# Patient Record
Sex: FEMALE | Race: WHITE | Employment: FULL TIME | URBAN - METROPOLITAN AREA
[De-identification: names, ages, dates, MRNs, and addresses within clinical notes are randomized per-mention and may not be internally consistent; named-entity substitution may affect disease eponyms.]

---

## 2017-01-19 ENCOUNTER — OFFICE VISIT (OUTPATIENT)
Dept: ORTHOPEDIC SURGERY | Age: 57
End: 2017-01-19

## 2017-01-19 VITALS — WEIGHT: 140 LBS | BODY MASS INDEX: 22.5 KG/M2 | HEIGHT: 66 IN

## 2017-01-19 DIAGNOSIS — M25.521 ELBOW PAIN, RIGHT: Primary | ICD-10-CM

## 2017-01-19 DIAGNOSIS — M77.01 MEDIAL EPICONDYLITIS, RIGHT ELBOW: ICD-10-CM

## 2017-01-19 PROCEDURE — MISCD86 TENNIS ELBOW STRAP-BREG: Performed by: ORTHOPAEDIC SURGERY

## 2017-01-19 PROCEDURE — 73080 X-RAY EXAM OF ELBOW: CPT | Performed by: ORTHOPAEDIC SURGERY

## 2017-01-19 PROCEDURE — 99203 OFFICE O/P NEW LOW 30 MIN: CPT | Performed by: ORTHOPAEDIC SURGERY

## 2017-01-20 ENCOUNTER — HOSPITAL ENCOUNTER (OUTPATIENT)
Dept: OCCUPATIONAL THERAPY | Age: 57
Discharge: HOME | End: 2017-01-31
Admitting: ORTHOPAEDIC SURGERY

## 2017-02-03 ENCOUNTER — HOSPITAL ENCOUNTER (OUTPATIENT)
Dept: OCCUPATIONAL THERAPY | Age: 57
Discharge: HOME | End: 2017-02-03
Admitting: ORTHOPAEDIC SURGERY

## 2017-05-01 ENCOUNTER — OFFICE VISIT (OUTPATIENT)
Dept: ORTHOPEDIC SURGERY | Age: 57
End: 2017-05-01

## 2017-05-01 VITALS — WEIGHT: 139.99 LBS | BODY MASS INDEX: 22.5 KG/M2 | HEIGHT: 66 IN

## 2017-05-01 DIAGNOSIS — M50.30 DDD (DEGENERATIVE DISC DISEASE), CERVICAL: Primary | ICD-10-CM

## 2017-05-01 DIAGNOSIS — M54.2 NECK PAIN: ICD-10-CM

## 2017-05-01 PROCEDURE — 99203 OFFICE O/P NEW LOW 30 MIN: CPT | Performed by: PHYSICAL MEDICINE & REHABILITATION

## 2017-05-01 PROCEDURE — 72040 X-RAY EXAM NECK SPINE 2-3 VW: CPT | Performed by: PHYSICAL MEDICINE & REHABILITATION

## 2017-05-01 RX ORDER — VENLAFAXINE 75 MG/1
TABLET ORAL
COMMUNITY
Start: 2017-04-11 | End: 2018-09-01

## 2017-05-01 RX ORDER — METHYLPREDNISOLONE 4 MG/1
TABLET ORAL
Qty: 1 KIT | Refills: 0 | Status: SHIPPED | OUTPATIENT
Start: 2017-05-01 | End: 2017-08-24 | Stop reason: ALTCHOICE

## 2017-05-01 RX ORDER — TAMOXIFEN CITRATE 20 MG/1
TABLET ORAL
COMMUNITY
Start: 2017-03-10

## 2017-05-01 RX ORDER — GABAPENTIN 300 MG/1
CAPSULE ORAL
COMMUNITY
Start: 2017-04-11 | End: 2018-09-01

## 2017-08-24 ENCOUNTER — OFFICE VISIT (OUTPATIENT)
Dept: ORTHOPEDIC SURGERY | Age: 57
End: 2017-08-24

## 2017-08-24 VITALS
DIASTOLIC BLOOD PRESSURE: 90 MMHG | HEART RATE: 100 BPM | BODY MASS INDEX: 22.5 KG/M2 | SYSTOLIC BLOOD PRESSURE: 127 MMHG | HEIGHT: 66 IN | WEIGHT: 140 LBS

## 2017-08-24 DIAGNOSIS — M24.829 ELBOW LOCKING: ICD-10-CM

## 2017-08-24 DIAGNOSIS — M77.01 MEDIAL EPICONDYLITIS, RIGHT ELBOW: Primary | ICD-10-CM

## 2017-08-24 PROCEDURE — 99213 OFFICE O/P EST LOW 20 MIN: CPT | Performed by: ORTHOPAEDIC SURGERY

## 2017-09-14 ENCOUNTER — OFFICE VISIT (OUTPATIENT)
Dept: ORTHOPEDIC SURGERY | Age: 57
End: 2017-09-14

## 2017-09-14 VITALS — BODY MASS INDEX: 22.32 KG/M2 | WEIGHT: 138.89 LBS | HEIGHT: 66 IN

## 2017-09-14 DIAGNOSIS — M77.01 MEDIAL EPICONDYLITIS, RIGHT ELBOW: ICD-10-CM

## 2017-09-14 DIAGNOSIS — M19.029 ELBOW ARTHRITIS: ICD-10-CM

## 2017-09-14 DIAGNOSIS — M24.829 ELBOW LOCKING: Primary | ICD-10-CM

## 2017-09-14 PROCEDURE — 99213 OFFICE O/P EST LOW 20 MIN: CPT | Performed by: ORTHOPAEDIC SURGERY

## 2018-06-21 ENCOUNTER — OFFICE VISIT (OUTPATIENT)
Dept: ORTHOPEDIC SURGERY | Age: 58
End: 2018-06-21

## 2018-06-21 VITALS — WEIGHT: 145 LBS | BODY MASS INDEX: 23.3 KG/M2 | HEIGHT: 66 IN

## 2018-06-21 DIAGNOSIS — M77.12 LEFT LATERAL EPICONDYLITIS: ICD-10-CM

## 2018-06-21 DIAGNOSIS — M77.01 MEDIAL EPICONDYLITIS, RIGHT ELBOW: ICD-10-CM

## 2018-06-21 DIAGNOSIS — M25.522 LEFT ELBOW PAIN: Primary | ICD-10-CM

## 2018-06-21 DIAGNOSIS — M25.521 RIGHT ELBOW PAIN: ICD-10-CM

## 2018-06-21 PROCEDURE — 99213 OFFICE O/P EST LOW 20 MIN: CPT | Performed by: ORTHOPAEDIC SURGERY

## 2018-07-06 ENCOUNTER — HOSPITAL ENCOUNTER (OUTPATIENT)
Dept: OCCUPATIONAL THERAPY | Age: 58
Discharge: HOME | End: 2018-07-03
Admitting: ORTHOPAEDIC SURGERY

## 2018-07-06 NOTE — PLAN OF CARE
barriers  []Other: Tolerance of evaluation/treatment:    [] Excellent [x] Good [] Fair  [] Poor    PLAN OF CARE:  Interventions:   [x] Therapeutic Exercise [x] Therapeutic Activity    [x] Activities of Daily Living [x] Neuromuscular Re-education      [x] Patient Education  [x] Manual Therapy      [x] Modalities as needed, and not otherwise contraindicated, including: ultrasound,paraffin,moist heat/cold pack, electrical stimulation, contrast bath, iontophoresis  [] Splinting    Frequency/Duration:  2 days per week for 6 weeks    GOALS:  Short Term Goals: To be achieved in: 2 weeks  1. Independent in HEP and progression per patient tolerance, in order to prevent re-injury. 2. Patient will have a decrease in pain to facilitate improvement in movement, function, and ADLs as indicated by Functional Deficits. Long Term Goals to be achieved in 6  weeks, including patient directed goals to address identified performance deficits:  1) Pt to be independent in graded HEP progression with a good level of effort and compliance. 2) Pt to report a score of 15 % or less on the Quick DASH disability questionnaire for increased performance with carrying, moving, and handling objects. 3) Pt will have a decrease in pain to 2/10 or less to facilitate improvement in performance with lifting, reaching, home mgt tasks, and work out.       OCCUPATIONAL THERAPY EVALUATION COMPLEXITY JUSTIFICATION:    [x] An occupational profile and medical/therapy history, which includes:   [x] a brief history including medical and/or therapy records relating to the     presenting problem   [] an expanded review of medical and/or therapy records and additional review     of physical, cognitive or psychosocial history related to current functional    performance   [] an extensive additional review of review of medical and/or therapy records   and physical, cognitive, or psychosocial history related to current    functional performance    [x] An

## 2018-07-11 ENCOUNTER — HOSPITAL ENCOUNTER (OUTPATIENT)
Dept: OCCUPATIONAL THERAPY | Age: 58
Discharge: HOME | End: 2018-07-12
Admitting: ORTHOPAEDIC SURGERY

## 2018-07-13 ENCOUNTER — HOSPITAL ENCOUNTER (OUTPATIENT)
Dept: OCCUPATIONAL THERAPY | Age: 58
Discharge: HOME | End: 2018-07-14
Admitting: ORTHOPAEDIC SURGERY

## 2018-07-13 NOTE — FLOWSHEET NOTE
[facility],  [address] [place]  Phone: [phone]  Fax [phone]  Hand Therapy Daily Treatment Note  Date:  2018    Patient: Jignesh Sarkar   : 1960   MRN: [id number]  Referring Physician: Referring Practitioner: Darrian       Medical Diagnosis Information:  M77.12 (ICD-10-CM) - Left lateral epicondylitis; M77.01 (ICD-10-CM) - Medial epicondylitis, right elbow;    Treatment Dx: U98.466 (ICD-10-CM) -Left elbow painM25.521 (ICD-10-CM) - Right elbow pain         Date of injury: 2 years ago progressive onset  Date and type of Surgery: N/A                                   Insurance information:  [place] [id number]/OT - NO AUTH    Comorbidities Affecting Functional Performance:     []Anxiety (F41.9)/Depression (F32.9)   []Diabetes Type 1(E10.65) or 2 (E11.65)   []Rheumatoid Arthritis (M05.9)  []Fibromyalgia (M79.7)  []Neuropathy(G60.9)  []Osteoarthritis(M19.91)  [x]None   []Other:    G-code: OT G-codes  Functional Assessment Tool Used: DASH  Score: 25%  Functional Limitation: Carrying, moving and handling objects  Carrying, Moving and Handling Objects Current Status (): At least 20 percent but less than 40 percent impaired, limited or restricted  Carrying, Moving and Handling Objects Goal Status ():  At least 1 percent but less than 20 percent impaired, limited or restricted    Date of Patient follow up with Physician:  Preferred Language for Healthcare:   [x]English       []other:  Latex Allergy:  [x]NO      []YES  RESTRICTIONS/PRECAUTIONS:   NONE   Progress Note: [x]  Yes  []  No  Next due by : Visit #10       Visit # Insurance Allowable Requires auth   3/60 60    no:[x]                  yes:[]      Pain level: 2/10     SUBJECTIVE: Pt reports no significant change in status yet however she is trying to follow all recommended precautions  she states why she is here after 2 years of pain is that it now aches all the time, and used to only hurt with use. Seems that left lateral elbow hurts the most, and she states that the right elbow cracks and gets stuck with quick elbow extension. History of breast cancer and finished chemo just before the canoeing incident. From that she always has tingling in her finger tips and her toes. She also has a history of back pain with steroid injection which helped her very much. That pain is starting to return. Pt reports chronic pain over the last 2 years, began with canoeing as hard as she could competing against her  2 years ago. OBJECTIVE:   Date:  Hand Dominance:     [x]  Right    [] Left 7/6/2018      Objective Measures:     PAIN 2-7/10   Quick DASH 25%   Digits tip to DPFC in cm WNL   Thumb ROM WNL   Wrist ROM Ext/Flex WNL   Forearm ROM  Sup/pron WNL   Elbow ROM Ext/flex WNL:   Shoulder Flex  Shoulder Abd  Shoulder IR/ER WNL   Edema in cm circumf. MCPJs R:  L:   Edema in cm circumf. Wrist R:  L:    strength in lbs R: 61#  L:60#   Pinch Strengthin lbs: lat  R:  L:   Pinch Strength in lbs:  3 point R:  L:      MMT: Shoulder ABD and Flex Shoulders L 5/5 and R  5-/5       Modalities: 7/6/18 7/11/18 7/13/18   TENS + HP each arm  Left lateral elbow, right medial and lateral elbow (used separate channels) INF + HP 15'         Therapeutic Exercise & Activities:      PROM   Forearm and wrist stretch   Pt educ HEP passive forearm/wrist stretching and ; pain mgt Passive wrist ext and flex using wall and tabletop    Pt educ recommendations & precautions related to ADLs     finisher   2# 8'   pilates ring   8'                                   Therapeutic Exercise and NMR EXR  [x] (89234) Provided verbal/tactile cueing for activities related to strengthening, flexibility, endurance, ROM  for improvements in scapular, scapulothoracic and UE control with self care, reaching, carrying, lifting, house/yardwork, driving/computer work.     [] (58463) Provided verbal/tactile cueing for activities related to improving balance, coordination, kinesthetic sense, posture, motor skill, proprioception  to assist with  scapular, scapulothoracic and UE control with self care, reaching, carrying, lifting, house/yardwork, driving/computer work. Therapeutic Activities:    [] ( 54350) therapeutic activities, direct (one on one) patient contact. Use of dynamic activities to improve functional performance.     Activities of Daily Living:  [] (47885) Provided self-care/home management training (i.e., activities of daily living and compensatory training, meal preparation, safety procedures, and instructions in use of assistive technology devices/adaptive equipment)     Home Exercise Program:    [] (66644) Reviewed/Progressed HEP activities related to strengthening, flexibility, endurance, ROM of scapular, scapulothoracic and UE control with self care, reaching, carrying, lifting, house/yardwork, driving/computer work    Manual Treatments: deep tissue mobs bilateral forearms, extensors and flexors near elbows, using biofreeze  [x] ([id number]) Provided manual therapy to mobilize soft tissue/joints of the UE for the purpose of modulating pain, promoting relaxation,  increasing ROM, reducing/eliminating soft tissue swelling/inflammation/restriction, improving soft tissue extensibility and allowing for proper ROM for normal function with self care, reaching, carrying, lifting, house/yardwork, driving/computer work    Splinting:  [] Fabrication of: L-code  [] (28318) Checkout for orthotic/prosthetic use, established patient   [] (47011) Orthotic management and training (fitting and assessment)  [] Comments:    Charges:  Timed Code Treatment Minutes: 45'   Total Treatment Minutes: 61'   [] EVAL (LOW) 22 345124   [] OT Re-eval (39802)  [] EVAL (MOD) 58957   [] EVAL (HIGH) 40692       [x] Kati ([id number]) x      [] CGACA(47044)  [x] NMR (62385) x      [] Estim (attended) (19886)   [x] Manual ([id number]) x       [] US (09019)  [] TA (89377) x      [] Paraffin (93538)  [] ADL  ([phone] [id number]) x     [] Splint/L code:    [x] Estim (unattended) (69550)  [] Other:  [](68802) Checkout for orthotic use, established patient x       [] (63782) Orthotic mgmt & training  x        GOALS:  Short Term Goals: To be achieved in: 2 weeks  1. Independent in HEP and progression per patient tolerance, in order to prevent re-injury. 2. Patient will have a decrease in pain to facilitate improvement in movement, function, and ADLs as indicated by Functional Deficits.     Long Term Goals to be achieved in 6  weeks, including patient directed goals to address identified performance deficits:  1) Pt to be independent in graded HEP progression with a good level of effort and compliance. 2) Pt to report a score of 15 % or less on the Quick DASH disability questionnaire for increased performance with carrying, moving, and handling objects. 3) Pt will have a decrease in pain to 2/10 or less to facilitate improvement in performance with lifting, reaching, home mgt tasks, and work out. Progression Towards Functional goals:  [x] Patient is progressing as expected towards functional goals listed. [] Progression is slowed due to complexities listed. [] Progression has been slowed due to co-morbidities.   [] Plan just implemented, too soon to assess goals progression  [] Other:     ASSESSMENT:  Reports pain relief by end  session    Treatment/Activity Tolerance:  [x] Patient tolerated treatment well [] Patient limited by fatique  [] Patient limited by pain  [] Patient limited by other medical complications  [] Other:     Prognosis: [x] Good [] Fair  [] Poor    Patient Requires Follow-up: [x] Yes  [] No    PLAN: Recommend Occupational Therapy 2 times a week for 5 weeks  [x] Continue per plan of care [] Alter current plan (see comments)  [] Plan of care initiated [] Hold pending MD visit [] Discharge    Plan for next session: pain mgt,

## 2018-07-17 ENCOUNTER — HOSPITAL ENCOUNTER (OUTPATIENT)
Dept: OCCUPATIONAL THERAPY | Age: 58
Setting detail: THERAPIES SERIES
Discharge: HOME | End: 2018-07-17
Payer: COMMERCIAL

## 2018-07-17 PROCEDURE — 97140 MANUAL THERAPY 1/> REGIONS: CPT | Performed by: OCCUPATIONAL THERAPIST

## 2018-07-17 PROCEDURE — 97110 THERAPEUTIC EXERCISES: CPT | Performed by: OCCUPATIONAL THERAPIST

## 2018-07-17 PROCEDURE — 97032 APPL MODALITY 1+ESTIM EA 15: CPT | Performed by: OCCUPATIONAL THERAPIST

## 2018-07-17 NOTE — FLOWSHEET NOTE
and used to only hurt with use. Seems that left lateral elbow hurts the most, and she states that the right elbow cracks and gets stuck with quick elbow extension. History of breast cancer and finished chemo just before the canoeing incident. From that she always has tingling in her finger tips and her toes. She also has a history of back pain with steroid injection which helped her very much. That pain is starting to return. Pt reports chronic pain over the last 2 years, began with canoeing as hard as she could competing against her  2 years ago. OBJECTIVE:   Date:  Hand Dominance:     [x]  Right    [] Left 7/6/2018      Objective Measures:     PAIN 2-7/10   Quick DASH 25%   Digits tip to DPFC in cm WNL   Thumb ROM WNL   Wrist ROM Ext/Flex WNL   Forearm ROM  Sup/pron WNL   Elbow ROM Ext/flex WNL:   Shoulder Flex  Shoulder Abd  Shoulder IR/ER WNL   Edema in cm circumf. MCPJs R:  L:   Edema in cm circumf.   Wrist R:  L:    strength in lbs R: 61#  L:60#   Pinch Strengthin lbs: lat  R:  L:   Pinch Strength in lbs:  3 point R:  L:      MMT: Shoulder ABD and Flex Shoulders L 5/5 and R  5-/5       Modalities: 7/6/18 7/11/18 7/13/18 7/17/18    TENS + HP each arm  Left lateral elbow, right medial and lateral elbow (used separate channels) INF + HP 15' IFC and HP B elbows x 15'          Therapeutic Exercise & Activities:       PROM   Forearm and wrist stretch Forearm stretch standing with palms on table top x 10    Pt educ HEP passive forearm/wrist stretching and ; pain mgt Passive wrist ext and flex using wall and tabletop     Pt educ recommendations & precautions related to ADLs      finisher   2# 8'    pilates ring   8'    Mid row and low row     Red t band x 15 x 2              Wall push up 15 x 2  Red flex bar eccentric wrist and concentric forearm x 20 each                    Therapeutic Exercise and NMR EXR  [x] (90536) Provided verbal/tactile cueing for activities related to strengthening, flexibility, endurance, ROM  for improvements in scapular, scapulothoracic and UE control with self care, reaching, carrying, lifting, house/yardwork, driving/computer work.    [] (28605) Provided verbal/tactile cueing for activities related to improving balance, coordination, kinesthetic sense, posture, motor skill, proprioception  to assist with  scapular, scapulothoracic and UE control with self care, reaching, carrying, lifting, house/yardwork, driving/computer work. Therapeutic Activities:    [] ( 61203) therapeutic activities, direct (one on one) patient contact. Use of dynamic activities to improve functional performance.     Activities of Daily Living:  [] (31457) Provided self-care/home management training (i.e., activities of daily living and compensatory training, meal preparation, safety procedures, and instructions in use of assistive technology devices/adaptive equipment)     Home Exercise Program:    [] (32301) Reviewed/Progressed HEP activities related to strengthening, flexibility, endurance, ROM of scapular, scapulothoracic and UE control with self care, reaching, carrying, lifting, house/yardwork, driving/computer work    Manual Treatments: deep tissue mobs bilateral forearms, extensors and flexors near elbows  [x] (20886) Provided manual therapy to mobilize soft tissue/joints of the UE for the purpose of modulating pain, promoting relaxation,  increasing ROM, reducing/eliminating soft tissue swelling/inflammation/restriction, improving soft tissue extensibility and allowing for proper ROM for normal function with self care, reaching, carrying, lifting, house/yardwork, driving/computer work    Splinting:  [] Fabrication of: L-code  [] (23235) Checkout for orthotic/prosthetic use, established patient   [] (52733) Orthotic management and training (fitting and assessment)  [] Comments:    Charges:  Timed Code Treatment Minutes: 30   Total Treatment Minutes: 45   [] EVAL (LOW) 69630   [] OT Re-eval

## 2018-07-19 ENCOUNTER — HOSPITAL ENCOUNTER (OUTPATIENT)
Dept: OCCUPATIONAL THERAPY | Age: 58
Setting detail: THERAPIES SERIES
Discharge: HOME | End: 2018-07-19
Payer: COMMERCIAL

## 2018-07-19 PROCEDURE — 97032 APPL MODALITY 1+ESTIM EA 15: CPT | Performed by: OCCUPATIONAL THERAPIST

## 2018-07-19 PROCEDURE — 97140 MANUAL THERAPY 1/> REGIONS: CPT | Performed by: OCCUPATIONAL THERAPIST

## 2018-07-19 NOTE — FLOWSHEET NOTE
Minutes: 15   Total Treatment Minutes: 35   [] EVAL (LOW) 58896   [] OT Re-eval (25390)  [] EVAL (MOD) 48375   [] EVAL (HIGH) 89491       [] Kati (82825) x      [] WZIRE(68778)  [] NMR (69731) x      [] Estim (attended) (41864)   [x] Manual (80436) x       [] US (07908)  [] TA (71414) x      [] Paraffin (39716)  [] ADL  (25469) x     [] Splint/L code:    [x] Estim (unattended) (13989)  [] Other:  [](33001) Checkout for orthotic use, established patient x       [] (29530) Orthotic mgmt & training  x        GOALS:  Short Term Goals: To be achieved in: 2 weeks  1. Independent in HEP and progression per patient tolerance, in order to prevent re-injury. 2. Patient will have a decrease in pain to facilitate improvement in movement, function, and ADLs as indicated by Functional Deficits.     Long Term Goals to be achieved in 6  weeks, including patient directed goals to address identified performance deficits:  1) Pt to be independent in graded HEP progression with a good level of effort and compliance. 2) Pt to report a score of 15 % or less on the Quick DASH disability questionnaire for increased performance with carrying, moving, and handling objects. 3) Pt will have a decrease in pain to 2/10 or less to facilitate improvement in performance with lifting, reaching, home mgt tasks, and work out. Progression Towards Functional goals:  [x] Patient is progressing as expected towards functional goals listed. [] Progression is slowed due to complexities listed. [] Progression has been slowed due to co-morbidities.   [] Plan just implemented, too soon to assess goals progression  [] Other:     ASSESSMENT:  Reports pain relief by end  session    Treatment/Activity Tolerance:  [x] Patient tolerated treatment well [] Patient limited by fatique  [] Patient limited by pain  [] Patient limited by other medical complications  [] Other:     Prognosis: [x] Good [] Fair  [] Poor    Patient Requires Follow-up: [x] Yes  [] No    PLAN: Recommend Occupational Therapy 2 times a week for 5 weeks  [x] Continue per plan of care [] Alter current plan (see comments)  [] Plan of care initiated [] Hold pending MD visit [] Discharge    Plan for next session: pain mgt, modalities, STM      Electronically signed by: Ximena Wallace OTR/L [id number]

## 2018-07-24 ENCOUNTER — HOSPITAL ENCOUNTER (OUTPATIENT)
Dept: OCCUPATIONAL THERAPY | Age: 58
Setting detail: THERAPIES SERIES
Discharge: HOME | End: 2018-07-24
Payer: COMMERCIAL

## 2018-07-24 PROCEDURE — 97140 MANUAL THERAPY 1/> REGIONS: CPT | Performed by: OCCUPATIONAL THERAPIST

## 2018-07-24 PROCEDURE — 97110 THERAPEUTIC EXERCISES: CPT | Performed by: OCCUPATIONAL THERAPIST

## 2018-07-24 NOTE — FLOWSHEET NOTE
[facility],  [address] [place]  Phone: [phone]  Fax [phone]  Hand Therapy Daily Treatment Note  Date:  2018    Patient: Stephanie Hoffmann   : 1960   MRN: [id number]  Referring Physician: Referring Practitioner: Darrian       Medical Diagnosis Information:  M77.12 (ICD-10-CM) - Left lateral epicondylitis; M77.01 (ICD-10-CM) - Medial epicondylitis, right elbow;    Treatment Dx: W13.150 (ICD-10-CM) -Left elbow painM25.521 (ICD-10-CM) - Right elbow pain         Date of injury: 2 years ago progressive onset  Date and type of Surgery: N/A                                   Insurance information:  [employer] [id number]/OT - NO AUTH    Comorbidities Affecting Functional Performance:     []Anxiety (F41.9)/Depression (F32.9)   []Diabetes Type 1(E10.65) or 2 (E11.65)   []Rheumatoid Arthritis (M05.9)  []Fibromyalgia (M79.7)  []Neuropathy(G60.9)  []Osteoarthritis(M19.91)  [x]None   []Other:    G-code: OT G-codes  Functional Assessment Tool Used: DASH  Score: 25%  Functional Limitation: Carrying, moving and handling objects  Carrying, Moving and Handling Objects Current Status (): At least 20 percent but less than 40 percent impaired, limited or restricted  Carrying, Moving and Handling Objects Goal Status ():  At least 1 percent but less than 20 percent impaired, limited or restricted    Date of Patient follow up with Physician:  Preferred Language for Healthcare:   [x]English       []other:  Latex Allergy:  [x]NO      []YES  RESTRICTIONS/PRECAUTIONS:   NONE   Progress Note: [x]  Yes  []  No  Next due by : Visit #10       Visit # Insurance Allowable Requires auth    60    no:[x]                  yes:[]      Pain level: 2/10     SUBJECTIVE: Pain is a bit less frequent   Could not stay for exercise today 18       she states why she is here after 2 years of pain is that it now aches NMR EXR  [x] (93813) Provided verbal/tactile cueing for activities related to strengthening, flexibility, endurance, ROM  for improvements in scapular, scapulothoracic and UE control with self care, reaching, carrying, lifting, house/yardwork, driving/computer work.    [] (73658) Provided verbal/tactile cueing for activities related to improving balance, coordination, kinesthetic sense, posture, motor skill, proprioception  to assist with  scapular, scapulothoracic and UE control with self care, reaching, carrying, lifting, house/yardwork, driving/computer work. Therapeutic Activities:    [] ( 96226) therapeutic activities, direct (one on one) patient contact. Use of dynamic activities to improve functional performance.     Activities of Daily Living:  [] (88489) Provided self-care/home management training (i.e., activities of daily living and compensatory training, meal preparation, safety procedures, and instructions in use of assistive technology devices/adaptive equipment)     Home Exercise Program:    [] (28851) Reviewed/Progressed HEP activities related to strengthening, flexibility, endurance, ROM of scapular, scapulothoracic and UE control with self care, reaching, carrying, lifting, house/yardwork, driving/computer work    Manual Treatments: deep tissue mobs bilateral forearms, extensors and flexors near elbows 15'  [x] (86307) Provided manual therapy to mobilize soft tissue/joints of the UE for the purpose of modulating pain, promoting relaxation,  increasing ROM, reducing/eliminating soft tissue swelling/inflammation/restriction, improving soft tissue extensibility and allowing for proper ROM for normal function with self care, reaching, carrying, lifting, house/yardwork, driving/computer work    Splinting:  [] Fabrication of: L-code  [] (56026) Checkout for orthotic/prosthetic use, established patient   [] (34489) Orthotic management and training (fitting and assessment)  [] Comments:    Charges:  Timed Code Treatment Minutes: 23   Total Treatment Minutes: 33   [] EVAL (LOW) 48479   [] OT Re-eval (46355)  [] EVAL (MOD) 93286   [] EVAL (HIGH) 54145       [x] Kati (10819) x      [] ELKMM(58445)  [] NMR (96855) x      [] Estim (attended) (08192)   [x] Manual ([id number]) x       [] US (11711)  [] TA (64524) x      [] Paraffin (62948)  [] ADL  (54604) x     [] Splint/L code:    [] Estim (unattended) (56890)  [] Other:  [](38579) Checkout for orthotic use, established patient x       [] (09271) Orthotic mgmt & training  x        GOALS:  Short Term Goals: To be achieved in: 2 weeks  1. Independent in HEP and progression per patient tolerance, in order to prevent re-injury. 2. Patient will have a decrease in pain to facilitate improvement in movement, function, and ADLs as indicated by Functional Deficits.     Long Term Goals to be achieved in 6  weeks, including patient directed goals to address identified performance deficits:  1) Pt to be independent in graded HEP progression with a good level of effort and compliance. 2) Pt to report a score of 15 % or less on the Quick DASH disability questionnaire for increased performance with carrying, moving, and handling objects. 3) Pt will have a decrease in pain to 2/10 or less to facilitate improvement in performance with lifting, reaching, home mgt tasks, and work out. Progression Towards Functional goals:  [x] Patient is progressing as expected towards functional goals listed. [] Progression is slowed due to complexities listed. [] Progression has been slowed due to co-morbidities.   [] Plan just implemented, too soon to assess goals progression  [] Other:     ASSESSMENT:  Reports pain relief by end  session    Treatment/Activity Tolerance:  [x] Patient tolerated treatment well [] Patient limited by fatique  [] Patient limited by pain  [] Patient limited by other medical complications  [] Other:     Prognosis: [x] Good [] Fair  []

## 2018-07-27 ENCOUNTER — HOSPITAL ENCOUNTER (OUTPATIENT)
Dept: OCCUPATIONAL THERAPY | Age: 58
Setting detail: THERAPIES SERIES
Discharge: HOME | End: 2018-07-27
Payer: COMMERCIAL

## 2018-07-27 PROCEDURE — 97140 MANUAL THERAPY 1/> REGIONS: CPT | Performed by: OCCUPATIONAL THERAPIST

## 2018-07-27 PROCEDURE — 97035 APP MDLTY 1+ULTRASOUND EA 15: CPT | Performed by: OCCUPATIONAL THERAPIST

## 2018-07-27 NOTE — FLOWSHEET NOTE
that it now aches all the time, and used to only hurt with use. Seems that left lateral elbow hurts the most, and she states that the right elbow cracks and gets stuck with quick elbow extension. History of breast cancer and finished chemo just before the canoeing incident. From that she always has tingling in her finger tips and her toes. She also has a history of back pain with steroid injection which helped her very much. That pain is starting to return. Pt reports chronic pain over the last 2 years, began with canoeing as hard as she could competing against her  2 years ago. OBJECTIVE:   Date:  Hand Dominance:     [x]  Right    [] Left 7/6/2018 7/27/18    Objective Measures:      PAIN 2-7/10 2-5/10    Quick DASH 25%    Digits tip to DPFC in cm WNL    Thumb ROM WNL    Wrist ROM Ext/Flex WNL    Forearm ROM  Sup/pron WNL    Elbow ROM Ext/flex WNL:    Shoulder Flex  Shoulder Abd  Shoulder IR/ER WNL    Edema in cm circumf. MCPJs R:  L:    Edema in cm circumf.   Wrist R:  L:     strength in lbs R: 61#  L:60#    Pinch Strengthin lbs: lat  R:  L:    Pinch Strength in lbs:  3 point R:  L:       MMT: Shoulder ABD and Flex Shoulders L 5/5 and R  5-/5        Modalities: 7/6/18 7/11/18 7/13/18 7/17/18 7/19/18 7/24/18 7/27/18    TENS + HP each arm  Left lateral elbow, right medial and lateral elbow (used separate channels) INF + HP 15' IFC and HP B elbows x 15' Same  Same  US L elbow 8'              Therapeutic Exercise & Activities:          PROM   Forearm and wrist stretch Forearm stretch standing with palms on table top x 10  Forearm stretching x 10  Same  Same    Pt educ HEP passive forearm/wrist stretching and ; pain mgt Passive wrist ext and flex using wall and tabletop        Pt educ recommendations & precautions related to ADLs         finisher   2# 8'       pilates ring   8'       Mid row and low row     Red t band x 15 x 2                    Wall push up 15 x 2  Red flex bar eccentric wrist and concentric forearm x 20 each                             Therapeutic Exercise and NMR EXR  [x] (42971) Provided verbal/tactile cueing for activities related to strengthening, flexibility, endurance, ROM  for improvements in scapular, scapulothoracic and UE control with self care, reaching, carrying, lifting, house/yardwork, driving/computer work.    [] (87216) Provided verbal/tactile cueing for activities related to improving balance, coordination, kinesthetic sense, posture, motor skill, proprioception  to assist with  scapular, scapulothoracic and UE control with self care, reaching, carrying, lifting, house/yardwork, driving/computer work. Therapeutic Activities:    [] ( 63763) therapeutic activities, direct (one on one) patient contact. Use of dynamic activities to improve functional performance.     Activities of Daily Living:  [] (22533) Provided self-care/home management training (i.e., activities of daily living and compensatory training, meal preparation, safety procedures, and instructions in use of assistive technology devices/adaptive equipment)     Home Exercise Program:    [] (18489) Reviewed/Progressed HEP activities related to strengthening, flexibility, endurance, ROM of scapular, scapulothoracic and UE control with self care, reaching, carrying, lifting, house/yardwork, driving/computer work    Manual Treatments: deep tissue mobs bilateral forearms, extensors and flexors near elbows 15'  [x] (16683) Provided manual therapy to mobilize soft tissue/joints of the UE for the purpose of modulating pain, promoting relaxation,  increasing ROM, reducing/eliminating soft tissue swelling/inflammation/restriction, improving soft tissue extensibility and allowing for proper ROM for normal function with self care, reaching, carrying, lifting, house/yardwork, driving/computer work    Splinting:  [] Fabrication of: L-code  [] (33927) Checkout for orthotic/prosthetic use, established patient   []

## 2018-07-31 ENCOUNTER — HOSPITAL ENCOUNTER (OUTPATIENT)
Dept: OCCUPATIONAL THERAPY | Age: 58
Setting detail: THERAPIES SERIES
End: 2018-07-31
Payer: COMMERCIAL

## 2018-09-01 ENCOUNTER — HOSPITAL ENCOUNTER (EMERGENCY)
Age: 58
Discharge: HOME | End: 2018-09-01
Attending: EMERGENCY MEDICINE
Payer: COMMERCIAL

## 2018-09-01 VITALS
WEIGHT: 143 LBS | OXYGEN SATURATION: 99 % | RESPIRATION RATE: 14 BRPM | SYSTOLIC BLOOD PRESSURE: 144 MMHG | DIASTOLIC BLOOD PRESSURE: 74 MMHG | TEMPERATURE: 98.2 F | HEART RATE: 92 BPM | BODY MASS INDEX: 22.98 KG/M2 | HEIGHT: 66 IN

## 2018-09-01 DIAGNOSIS — L23.7 POISON IVY DERMATITIS: Primary | ICD-10-CM

## 2018-09-01 PROCEDURE — 99282 EMERGENCY DEPT VISIT SF MDM: CPT

## 2018-09-01 RX ORDER — PREDNISONE 10 MG/1
TABLET ORAL
Qty: 30 TABLET | Refills: 0 | Status: SHIPPED | OUTPATIENT
Start: 2018-09-01 | End: 2018-09-11

## 2018-09-01 RX ORDER — HYDROXYZINE HYDROCHLORIDE 25 MG/1
25-50 TABLET, FILM COATED ORAL 3 TIMES DAILY PRN
Qty: 30 TABLET | Refills: 1 | Status: SHIPPED | OUTPATIENT
Start: 2018-09-01 | End: 2018-09-11

## 2018-09-01 NOTE — ED TRIAGE NOTES
Patient to ed with complaints of a rash which started Tues and worsened, patient reports working in her yard and coming in contact with poison ivy.

## 2018-09-01 NOTE — ED NOTES
Patient given prescription, discharge instructions verbal and written, patient verbalized understanding. Alert/oriented X4, Clear speech.   Patient exhibits no distress, ambulates with steady gait per self leaving unit, no further request.     Maggie Bhakta RN  09/01/18 [id number]

## 2018-09-02 NOTE — ED PROVIDER NOTES
evaluation in the Emergency Department, diagnosis, care and prognosis. The plan is to discharge to home. The patient is in agreement with the plan and questions have been answered. I also discussed with Erlin Duran the reasons which may require a return visit and the importance of follow-up care.         FINAL IMPRESSION:  1 -- Rhus dermatitis                     Nilda Cummings MD  09/02/18 [id number]

## 2018-10-12 ENCOUNTER — OFFICE VISIT (OUTPATIENT)
Dept: ORTHOPEDIC SURGERY | Age: 58
End: 2018-10-12
Payer: COMMERCIAL

## 2018-10-12 VITALS
WEIGHT: 143.08 LBS | SYSTOLIC BLOOD PRESSURE: 127 MMHG | HEIGHT: 66 IN | BODY MASS INDEX: 22.99 KG/M2 | DIASTOLIC BLOOD PRESSURE: 84 MMHG | HEART RATE: 97 BPM

## 2018-10-12 DIAGNOSIS — M54.2 NECK PAIN: Primary | ICD-10-CM

## 2018-10-12 DIAGNOSIS — M50.30 DDD (DEGENERATIVE DISC DISEASE), CERVICAL: ICD-10-CM

## 2018-10-12 PROCEDURE — 99214 OFFICE O/P EST MOD 30 MIN: CPT | Performed by: PHYSICAL MEDICINE & REHABILITATION

## 2018-10-12 RX ORDER — PREDNISONE 10 MG/1
TABLET ORAL
Qty: 26 TABLET | Refills: 0 | Status: SHIPPED | OUTPATIENT
Start: 2018-10-12 | End: 2021-01-20

## 2018-12-14 ENCOUNTER — TELEPHONE (OUTPATIENT)
Dept: ORTHOPEDIC SURGERY | Age: 58
End: 2018-12-14

## 2018-12-14 ENCOUNTER — OFFICE VISIT (OUTPATIENT)
Dept: ORTHOPEDIC SURGERY | Age: 58
End: 2018-12-14
Payer: COMMERCIAL

## 2018-12-14 VITALS
BODY MASS INDEX: 22.99 KG/M2 | HEIGHT: 66 IN | SYSTOLIC BLOOD PRESSURE: 124 MMHG | DIASTOLIC BLOOD PRESSURE: 84 MMHG | HEART RATE: 81 BPM | WEIGHT: 143.08 LBS

## 2018-12-14 DIAGNOSIS — M50.30 DDD (DEGENERATIVE DISC DISEASE), CERVICAL: ICD-10-CM

## 2018-12-14 DIAGNOSIS — M54.50 LUMBAR SPINE PAIN: Primary | ICD-10-CM

## 2018-12-14 PROCEDURE — 99214 OFFICE O/P EST MOD 30 MIN: CPT | Performed by: PHYSICIAN ASSISTANT

## 2018-12-14 RX ORDER — MELOXICAM 15 MG/1
TABLET ORAL
Qty: 30 TABLET | Refills: 1 | Status: SHIPPED | OUTPATIENT
Start: 2018-12-14 | End: 2021-01-20

## 2019-01-07 ENCOUNTER — TELEPHONE (OUTPATIENT)
Dept: ORTHOPEDIC SURGERY | Age: 59
End: 2019-01-07

## 2021-01-20 ENCOUNTER — OFFICE VISIT (OUTPATIENT)
Dept: ORTHOPEDIC SURGERY | Age: 61
End: 2021-01-20
Payer: COMMERCIAL

## 2021-01-20 VITALS — HEIGHT: 66 IN | WEIGHT: 130 LBS | BODY MASS INDEX: 20.89 KG/M2

## 2021-01-20 DIAGNOSIS — M54.12 CERVICAL RADICULOPATHY: ICD-10-CM

## 2021-01-20 DIAGNOSIS — M50.30 DDD (DEGENERATIVE DISC DISEASE), CERVICAL: ICD-10-CM

## 2021-01-20 DIAGNOSIS — M54.2 NECK PAIN: Primary | ICD-10-CM

## 2021-01-20 PROCEDURE — 99214 OFFICE O/P EST MOD 30 MIN: CPT | Performed by: PHYSICAL MEDICINE & REHABILITATION

## 2021-01-20 RX ORDER — PREDNISONE 10 MG/1
TABLET ORAL
Qty: 26 TABLET | Refills: 0 | Status: SHIPPED | OUTPATIENT
Start: 2021-01-20

## 2021-01-20 NOTE — PROGRESS NOTES
Cervical follow up: SPINE    CHIEF COMPLAINT:    Chief Complaint   Patient presents with    Neck Pain     OP/SP NECK PAIN       HISTORY OF PRESENT ILLNESS:                The patient is a 61 y.o. female I last saw 2 and half years ago. 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 She has spondylosis and disc bulging from her prior MRI C5-6>C6-7    She reports 6-month history of progressive initial neck pain referring over the left shoulder now her left posterior triceps. She denies any distal numbness or tingling. She reports no weakness. She has no coordination loss in her lower extremities. He recently took a Medrol Dosepak without change. She is also use ibuprofen    Past Medical History:   Diagnosis Date    Breast cancer, left ([address] 03/2016    Surg/Chemo/XRT: left spindle cell carcinoma    Bursitis     R hip    Fibromyalgia     GERD (gastroesophageal reflux disease)     Hyperlipidemia     Elevated Triglycerides, Good HDL    Idiopathic polyneuropathy     Migraine     Postmenopausal age 64    Vitamin D deficiency 11/06/2013          Pain Assessment  Location of Pain: Neck  Location Modifiers: Left  Severity of Pain: 6  Quality of Pain: Aching  Duration of Pain: Persistent  Frequency of Pain: Intermittent  Aggravating Factors: Bending, Stretching, Straightening  Limiting Behavior: Yes  Relieving Factors: Rest  Result of Injury: No  Work-Related Injury: No  Are there other pain locations you wish to document?: No    The pain assessment was noted & reviewed in the medical record today.      Current/Past Treatment:   · Physical Therapy:   · Chiropractic:     · Injection:     Medications:            NSAIDS:             Muscle relaxer:              Steriods:              Neuropathic medications:              Opioids:            Other:   · Surgery/Consult:    Work Status/Functionality:     Past Medical History: Medical history form was reviewed today & scanned into the media tab  Past Medical History:   Diagnosis Date    Breast cancer, left ([address] 2016    Surg/Chemo/XRT: left spindle cell carcinoma    Bursitis     R hip    Fibromyalgia     GERD (gastroesophageal reflux disease)     Hyperlipidemia     Elevated Triglycerides, Good HDL    Idiopathic polyneuropathy     Migraine     Postmenopausal age 64    Vitamin D deficiency 2013      Past Surgical History:     Past Surgical History:   Procedure Laterality Date    BREAST LUMPECTOMY Left 2016    BREAST LUMPECTOMY Left 2016    re-excision of margins     SECTION      COLONOSCOPY  10/28/2016    diverticulosis, follow up in 5 years    ENDOMETRIAL ABLATION  age 39   Nata Quezada KNEE SURGERY Right     LASIK Bilateral age 44    TUNNELED VENOUS PORT PLACEMENT Right 2016    UPPER GASTROINTESTINAL ENDOSCOPY  10/24/2011    UPPER GASTROINTESTINAL ENDOSCOPY  10/28/2016    follow up in 3 years    WISDOM TOOTH EXTRACTION Bilateral      Current Medications:     Current Outpatient Medications:     predniSONE (DELTASONE) 10 MG tablet, SIG: iii po BID x 2 days then ii po BID x 2 days then i po BID x 2 days then i po qd x 2 days, Disp: 26 tablet, Rfl: 0    tamoxifen (NOLVADEX) 20 MG tablet, , Disp: , Rfl:     lansoprazole (PREVACID) 30 MG delayed release capsule, Take 1 capsule by mouth daily, Disp: 90 capsule, Rfl: 3  Allergies:  Latex, Adhesive tape, and Pregabalin  Social History:    reports that she has never smoked. She has never used smokeless tobacco. She reports current alcohol use of about 0.8 - 1.7 standard drinks of alcohol per week. She reports that she does not use drugs.   Family History:   Family History   Problem Relation Age of Onset   Nata Quezada Migraines Mother     Esophageal Cancer Mother         and Damon's Esophagus    Hypertension Father     High Cholesterol Father     Colon Cancer Father 59    Colon Cancer Paternal Grandfather 54    Stroke Maternal Grandfather     Stroke Maternal Grandmother     Migraines Daughter     Breast Cancer Sister 46       REVIEW OF SYSTEMS: Full ROS noted & scanned   CONSTITUTIONAL: Denies unexplained weight loss, fevers, chills or fatigue  NEUROLOGICAL: Denies unsteady gait or progressive weakness  MUSCULOSKELETAL: Denies joint swelling or redness  PSYCHOLOGICAL: Denies anxiety, depression   SKIN: Denies skin changes, delayed healing, rash, itching   HEMATOLOGIC: Denies easy bleeding or bruising  ENDOCRINE: Denies excessive thirst, urination, heat/cold  RESPIRATORY: Denies current dyspnea, cough  GI: Denies nausea, vomiting, diarrhea   : Denies bowel or bladder issues       PHYSICAL EXAM:    Vitals: Height 5' 5.98\" (1.676 m), weight 130 lb (59 kg), last menstrual period 08/01/2015, not currently breastfeeding. GENERAL EXAM:  · General Apparence: Patient is adequately groomed with no evidence of malnutrition. · Orientation: The patient is oriented to time, place and person. · Mood & Affect:The patient's mood and affect are appropriate   · Vascular: Examination reveals no swelling tenderness in upper or lower extremities. Good capillary refill  · Lymphatic: The lymphatic examination bilaterally reveals all areas to be without enlargement or induration  · Sensation: Sensation is intact without deficit  · Coordination/Balance: Good coordination     CERVICAL EXAMINATION:  · Inspection: Local inspection shows no step-off or bruising. Cervical alignment is normal.     · Palpation: No evidence of tenderness at the midline, and trapezius. Paraspinal tenderness is present. There is no step-off or paraspinal spasm. · Range of Motion: Mild loss of extension of the left  · Strength: 5/5 bilateral upper extremities   · Special Tests:    ·   Spurling's positive on the left, L'Hermitte's & Zaldivar's negative bilaterally. ·   Whitlock and Impingement tests are negative bilaterally.    ·  Cubital and Carpal tunnel Tinel's negative injury. Range of motion is full. There is no gross instability. There are no rashes, ulcerations or lesions.   Strength and tone are normal.    Diagnostic Testing:      January 20, 2021 4 view cervical spine shows advanced discogenic spondylosis with endplate spurring D9-2, there is left foraminal stenosis at C5-6    Impression:    Discogenic spondylosis C5-6 with foraminal stenosis  Left radicular arm pain x6 months with triceps weakness  Underlying idiopathic/chemo-induced peripheral neuropathy  Strep breast cancer    Plan:     Triceps weakness is concerning as this been ongoing for 6 months    EMG left upper extremity  MRI cervical spine    Follow-up after      KAROLINA Sawyer

## 2021-01-21 ENCOUNTER — OFFICE VISIT (OUTPATIENT)
Dept: ORTHOPEDIC SURGERY | Age: 61
End: 2021-01-21
Payer: COMMERCIAL

## 2021-01-21 DIAGNOSIS — R29.898 LEFT ARM WEAKNESS: Primary | ICD-10-CM

## 2021-01-21 PROCEDURE — 95909 NRV CNDJ TST 5-6 STUDIES: CPT | Performed by: PHYSICAL MEDICINE & REHABILITATION

## 2021-01-21 NOTE — PROGRESS NOTES
developed vasovagal symptoms. Conclusion:  Incomplete examination. 1. There is no nerve conduction study evidence of a left upper extremity mononeuropathy  2. Monopolar exam is incomplete.   Cervical radiculopathy cannot be completely excluded              Heather Payne MD    Board Certified Physical Medicine and Rehabilitation

## 2022-08-05 ENCOUNTER — TELEPHONE (OUTPATIENT)
Dept: ORTHOPEDIC SURGERY | Age: 62
End: 2022-08-05

## 2022-08-05 NOTE — TELEPHONE ENCOUNTER
Faxed 1/21/2021 EMG report to Dr. Pura Jordan with [facility] @ [phone] attn:  Massiel Nielsen

## 2024-03-30 ENCOUNTER — APPOINTMENT (OUTPATIENT)
Dept: GENERAL RADIOLOGY | Age: 64
End: 2024-03-30
Payer: COMMERCIAL

## 2024-03-30 ENCOUNTER — HOSPITAL ENCOUNTER (EMERGENCY)
Age: 64
Discharge: HOME | End: 2024-03-30
Payer: COMMERCIAL

## 2024-03-30 VITALS
BODY MASS INDEX: 22.5 KG/M2 | RESPIRATION RATE: 18 BRPM | OXYGEN SATURATION: 100 % | DIASTOLIC BLOOD PRESSURE: 77 MMHG | HEIGHT: 66 IN | SYSTOLIC BLOOD PRESSURE: 151 MMHG | HEART RATE: 90 BPM | TEMPERATURE: 99.3 F | WEIGHT: 140 LBS

## 2024-03-30 DIAGNOSIS — S82.202A CLOSED FRACTURE OF LEFT TIBIA AND FIBULA, INITIAL ENCOUNTER: Primary | ICD-10-CM

## 2024-03-30 DIAGNOSIS — S82.402A CLOSED FRACTURE OF LEFT TIBIA AND FIBULA, INITIAL ENCOUNTER: Primary | ICD-10-CM

## 2024-03-30 PROCEDURE — 73630 X-RAY EXAM OF FOOT: CPT

## 2024-03-30 PROCEDURE — 29515 APPLICATION SHORT LEG SPLINT: CPT

## 2024-03-30 PROCEDURE — 73610 X-RAY EXAM OF ANKLE: CPT

## 2024-03-30 PROCEDURE — 99283 EMERGENCY DEPT VISIT LOW MDM: CPT

## 2024-03-30 ASSESSMENT — PAIN DESCRIPTION - LOCATION: LOCATION: ANKLE

## 2024-03-30 ASSESSMENT — PAIN DESCRIPTION - ORIENTATION: ORIENTATION: LEFT

## 2024-03-30 ASSESSMENT — PAIN - FUNCTIONAL ASSESSMENT: PAIN_FUNCTIONAL_ASSESSMENT: 0-10

## 2024-03-30 ASSESSMENT — PAIN SCALES - GENERAL: PAINLEVEL_OUTOF10: 1

## 2024-03-31 NOTE — DISCHARGE INSTRUCTIONS
You were seen in the emergency department for an ankle fracture.  X-ray shows a fracture of the distal fibula.  Use crutches to remain nonweightbearing.  Please follow-up with orthopedics.  An appointment has been made with Dr. Antonio at his [place] office on Tuesday at 9:15 AM.     You can take ibuprofen or Tylenol for pain as needed.  If you develop any new or worsening symptoms return the emergency department

## 2024-03-31 NOTE — ED PROVIDER NOTES
[facility]  ED  EMERGENCY DEPARTMENT ENCOUNTER        Pt Name: Kavitha Hull  MRN: [id number]  Birthdate 1960  Date of evaluation: 3/30/2024  Provider: NEELIMA Snyder  PCP: Juliano Hall MD  Note Started: 9:04 PM EDT 3/30/24      KADEN. I have evaluated this patient.        CHIEF COMPLAINT       Chief Complaint   Patient presents with    Ankle Pain     Patient tripped over dog toys approximately 1615 and now her left ankle has swelling and pain.       HISTORY OF PRESENT ILLNESS: 1 or more Elements     History from : Patient    Limitations to history : None    Kavitha Hull is a 64 y.o. female who presents to the emergency department today complaining of left ankle pain.  Patient states that around 415 she tripped over her dog toys and rolled her left ankle.  She states initially she felt a cramping sensation from her foot up into her calf and thought she was having a muscle spasm however after taking off her sock and shoe she noticed her ankle was significantly swollen.  Pain is primarily on the lateral aspect of her ankle.  She did try taking Motrin prior to arrival with good relief of pain however still unable to ambulate.  She did not hit her head and there was no loss of consciousness.  She is not on a blood thinner.    Nursing Notes were all reviewed and agreed with or any disagreements were addressed in the HPI.    REVIEW OF SYSTEMS :      Review of Systems    Positives and Pertinent negatives as per HPI.     SURGICAL HISTORY     Past Surgical History:   Procedure Laterality Date    BREAST LUMPECTOMY Left 2016    BREAST LUMPECTOMY Left 2016    re-excision of margins     SECTION      COLONOSCOPY  10/28/2016    diverticulosis, follow up in 5 years    ENDOMETRIAL ABLATION  age 45    KNEE SURGERY Right     LASIK Bilateral age 39    TUNNELED VENOUS PORT PLACEMENT Right 2016    UPPER GASTROINTESTINAL ENDOSCOPY  10/24/2011    UPPER GASTROINTESTINAL  Pt updated on wait for transfer, denies needs at this time, continue to monitor     [facility], RN  09/12/21 [id number]

## 2024-04-01 ENCOUNTER — TELEPHONE (OUTPATIENT)
Dept: ORTHOPEDIC SURGERY | Age: 64
End: 2024-04-01

## 2024-04-02 ENCOUNTER — OFFICE VISIT (OUTPATIENT)
Dept: ORTHOPEDIC SURGERY | Age: 64
End: 2024-04-02
Payer: COMMERCIAL

## 2024-04-02 DIAGNOSIS — S82.62XA CLOSED DISPLACED FRACTURE OF LATERAL MALLEOLUS OF LEFT FIBULA, INITIAL ENCOUNTER: Primary | ICD-10-CM

## 2024-04-02 DIAGNOSIS — S92.332A CLOSED DISPLACED FRACTURE OF THIRD METATARSAL BONE OF LEFT FOOT, INITIAL ENCOUNTER: ICD-10-CM

## 2024-04-02 PROCEDURE — 27786 TREATMENT OF ANKLE FRACTURE: CPT | Performed by: ORTHOPAEDIC SURGERY

## 2024-04-02 PROCEDURE — 99203 OFFICE O/P NEW LOW 30 MIN: CPT | Performed by: ORTHOPAEDIC SURGERY

## 2024-04-02 PROCEDURE — L4361 PNEUMA/VAC WALK BOOT PRE OTS: HCPCS | Performed by: ORTHOPAEDIC SURGERY

## 2024-04-02 PROCEDURE — 28470 CLTX METATARSAL FX WO MNP EA: CPT | Performed by: ORTHOPAEDIC SURGERY

## 2024-04-02 NOTE — PROGRESS NOTES
CHIEF COMPLAINT:   1- Left ankle pain / lateral malleolus fracture.  2- Left foot pain/ 3rd MT base.    DATE OF INJURY: 3/30/2024, DOT 2024    HISTORY:  Ms. Hull 64 y.o.  female presents today for the first visit for evaluation of a left foot and ankle injury which occurred when she might had a syncope which happens frequent when she gets emotional possible from vasovagal while visiting someone at hospice.  She was first seen and evaluated in [facility], where she was x-rayed, splinted and asked to f/u with Orthopedics. She is complaining of lateral ankle and dorsal foot pain and swelling 0-1/10. This is better with elevation and worse with bearing any wt. The pain is sharp and not radiating. No numbness or tingling sensation. Alleviating factors: rest. No other complaint.     Past Medical History:   Diagnosis Date    Breast cancer, left (HCC) 2016    Surg/Chemo/XRT: left spindle cell carcinoma    Bursitis     R hip    Fibromyalgia     GERD (gastroesophageal reflux disease)     Hyperlipidemia     Elevated Triglycerides, Good HDL    Idiopathic polyneuropathy     Migraine     Postmenopausal age 56    Vitamin D deficiency 2013       Past Surgical History:   Procedure Laterality Date    BREAST LUMPECTOMY Left 2016    BREAST LUMPECTOMY Left 2016    re-excision of margins     SECTION      COLONOSCOPY  10/28/2016    diverticulosis, follow up in 5 years    ENDOMETRIAL ABLATION  age 45    KNEE SURGERY Right     LASIK Bilateral age 39    TUNNELED VENOUS PORT PLACEMENT Right 2016    UPPER GASTROINTESTINAL ENDOSCOPY  10/24/2011    UPPER GASTROINTESTINAL ENDOSCOPY  10/28/2016    follow up in 3 years    WISDOM TOOTH EXTRACTION Bilateral        Social History     Socioeconomic History    Marital status:      Spouse name: Andre    Number of children: 2    Years of education: College    Highest education level: Not on file   Occupational History    Occupation:

## 2024-04-17 ENCOUNTER — TELEPHONE (OUTPATIENT)
Dept: ORTHOPEDIC SURGERY | Age: 64
End: 2024-04-17

## 2024-04-17 NOTE — TELEPHONE ENCOUNTER
General Question     Subject: LT LEG   Patient and /or Facility Request: Kavitha Hull   Contact Number: [phone]     PATIENT CALLING REGARDING QUESTION ABOUT HER LT LEG     PATIENT STATED HER FOOT, AND THE ANKLE IS STILL SWOLLEN , PATIENT WOULD LIKE TO KNOW IS THIS NORMAL     PATIENT WAS LAST SEEN 4/2/24  SCD FOR 5/14/24 FOR A FOLLOW UP    PLEASE CALL PATIENT BACK AT THE ABOVE NUMBER

## 2024-04-17 NOTE — TELEPHONE ENCOUNTER
Spoke with patient. She stated that she is concerned that there is still quite a bit of swelling. \"Not so much swelling that she needs to rush to the ER, but enough that she wanted to check that it was normal.\" She stated that she is not icing. She is elevating in a recliner. The patient was instructed to begin elevating above heart level in a bed on on a couch with pillows. She was also informed that she can ice 20 minutes on and 40 minutes off throughout the day. I also suggested knee high compression socks for swelling. Patient stated she understood and agreed.

## 2024-05-14 ENCOUNTER — OFFICE VISIT (OUTPATIENT)
Dept: ORTHOPEDIC SURGERY | Age: 64
End: 2024-05-14

## 2024-05-14 VITALS — BODY MASS INDEX: 22.5 KG/M2 | WEIGHT: 140 LBS | HEIGHT: 66 IN

## 2024-05-14 DIAGNOSIS — S82.62XA CLOSED DISPLACED FRACTURE OF LATERAL MALLEOLUS OF LEFT FIBULA, INITIAL ENCOUNTER: Primary | ICD-10-CM

## 2024-05-14 DIAGNOSIS — S92.332A CLOSED DISPLACED FRACTURE OF THIRD METATARSAL BONE OF LEFT FOOT, INITIAL ENCOUNTER: ICD-10-CM

## 2024-05-14 PROCEDURE — 99024 POSTOP FOLLOW-UP VISIT: CPT | Performed by: ORTHOPAEDIC SURGERY

## 2024-05-22 NOTE — PROGRESS NOTES
CHIEF COMPLAINT:   1- Left ankle pain / lateral malleolus fracture.  2- Left foot pain/ 3rd MT base fracture.    DATE OF INJURY: 3/30/2024, DOT 2024    HISTORY:  Ms. Hull 64 y.o.  female presents today for f/u evaluation of a left foot and ankle injury which occurred when she might had a syncope which happens frequent when she gets emotional possible from vasovagal while visiting someone at hospice.  She was first seen and evaluated in [facility], where she was x-rayed, splinted and asked to f/u with Orthopedics. She is complaining of lateral ankle and dorsal foot pain and swelling 1/10. This is better with elevation and worse with bearing any wt. The pain is sharp and not radiating. No numbness or tingling sensation. Alleviating factors: rest. No other complaint.     Past Medical History:   Diagnosis Date    Breast cancer, left (HCC) 2016    Surg/Chemo/XRT: left spindle cell carcinoma    Bursitis     R hip    Fibromyalgia     GERD (gastroesophageal reflux disease)     Hyperlipidemia     Elevated Triglycerides, Good HDL    Idiopathic polyneuropathy     Migraine     Postmenopausal age 56    Vitamin D deficiency 2013       Past Surgical History:   Procedure Laterality Date    BREAST LUMPECTOMY Left 2016    BREAST LUMPECTOMY Left 2016    re-excision of margins     SECTION      COLONOSCOPY  10/28/2016    diverticulosis, follow up in 5 years    ENDOMETRIAL ABLATION  age 45    KNEE SURGERY Right     LASIK Bilateral age 39    TUNNELED VENOUS PORT PLACEMENT Right 2016    UPPER GASTROINTESTINAL ENDOSCOPY  10/24/2011    UPPER GASTROINTESTINAL ENDOSCOPY  10/28/2016    follow up in 3 years    WISDOM TOOTH EXTRACTION Bilateral        Social History     Socioeconomic History    Marital status:      Spouse name: Andre    Number of children: 2    Years of education: College    Highest education level: Not on file   Occupational History    Occupation:    Tobacco

## 2024-07-16 ENCOUNTER — OFFICE VISIT (OUTPATIENT)
Dept: ORTHOPEDIC SURGERY | Age: 64
End: 2024-07-16
Payer: COMMERCIAL

## 2024-07-16 DIAGNOSIS — S82.62XA CLOSED DISPLACED FRACTURE OF LATERAL MALLEOLUS OF LEFT FIBULA, INITIAL ENCOUNTER: Primary | ICD-10-CM

## 2024-07-16 DIAGNOSIS — S92.332A CLOSED DISPLACED FRACTURE OF THIRD METATARSAL BONE OF LEFT FOOT, INITIAL ENCOUNTER: ICD-10-CM

## 2024-07-16 PROCEDURE — 99213 OFFICE O/P EST LOW 20 MIN: CPT | Performed by: ORTHOPAEDIC SURGERY

## 2024-07-16 PROCEDURE — 1036F TOBACCO NON-USER: CPT | Performed by: ORTHOPAEDIC SURGERY

## 2024-07-16 PROCEDURE — 3017F COLORECTAL CA SCREEN DOC REV: CPT | Performed by: ORTHOPAEDIC SURGERY

## 2024-07-16 PROCEDURE — G8427 DOCREV CUR MEDS BY ELIG CLIN: HCPCS | Performed by: ORTHOPAEDIC SURGERY

## 2024-07-16 PROCEDURE — G8420 CALC BMI NORM PARAMETERS: HCPCS | Performed by: ORTHOPAEDIC SURGERY

## 2024-07-16 NOTE — PROGRESS NOTES
CHIEF COMPLAINT:   1- Left ankle pain / lateral malleolus fracture.  2- Left foot pain/ 3rd MT base fracture.    DATE OF INJURY: 3/30/2024, DOT 2024    HISTORY:  Ms. Hull 64 y.o.  female presents today for f/u evaluation of a left foot and ankle injury which occurred when she might had a syncope which happens frequent when she gets emotional possible from vasovagal while visiting someone at hospice.  She was first seen and evaluated in [facility], where she was x-rayed, splinted and asked to f/u with Orthopedics. She is complaining of lateral ankle and dorsal foot pain and swelling /10. This is better with elevation and worse with bearing any wt. The pain is sharp and not radiating. No numbness or tingling sensation. Alleviating factors: rest. No other complaint.     Past Medical History:   Diagnosis Date    Breast cancer, left (HCC) 2016    Surg/Chemo/XRT: left spindle cell carcinoma    Bursitis     R hip    Fibromyalgia     GERD (gastroesophageal reflux disease)     Hyperlipidemia     Elevated Triglycerides, Good HDL    Idiopathic polyneuropathy     Migraine     Postmenopausal age 56    Vitamin D deficiency 2013       Past Surgical History:   Procedure Laterality Date    BREAST LUMPECTOMY Left 2016    BREAST LUMPECTOMY Left 2016    re-excision of margins     SECTION      COLONOSCOPY  10/28/2016    diverticulosis, follow up in 5 years    ENDOMETRIAL ABLATION  age 45    KNEE SURGERY Right     LASIK Bilateral age 39    TUNNELED VENOUS PORT PLACEMENT Right 2016    UPPER GASTROINTESTINAL ENDOSCOPY  10/24/2011    UPPER GASTROINTESTINAL ENDOSCOPY  10/28/2016    follow up in 3 years    WISDOM TOOTH EXTRACTION Bilateral        Social History     Socioeconomic History    Marital status:      Spouse name: Andre    Number of children: 2    Years of education: College    Highest education level: Not on file   Occupational History    Occupation:    Tobacco